# Patient Record
Sex: FEMALE | Race: WHITE | NOT HISPANIC OR LATINO | Employment: OTHER | URBAN - METROPOLITAN AREA
[De-identification: names, ages, dates, MRNs, and addresses within clinical notes are randomized per-mention and may not be internally consistent; named-entity substitution may affect disease eponyms.]

---

## 2021-05-28 ENCOUNTER — OFFICE VISIT (OUTPATIENT)
Dept: OBSTETRICS AND GYNECOLOGY | Facility: CLINIC | Age: 81
End: 2021-05-28
Payer: MEDICARE

## 2021-05-28 VITALS — SYSTOLIC BLOOD PRESSURE: 120 MMHG | DIASTOLIC BLOOD PRESSURE: 78 MMHG

## 2021-05-28 DIAGNOSIS — Z46.89 ENCOUNTER FOR FITTING AND ADJUSTMENT OF PESSARY: ICD-10-CM

## 2021-05-28 DIAGNOSIS — R33.9 URINARY RETENTION: ICD-10-CM

## 2021-05-28 DIAGNOSIS — N81.2 UTEROVAGINAL PROLAPSE, INCOMPLETE: Primary | ICD-10-CM

## 2021-05-28 DIAGNOSIS — Z12.4 CERVICAL CANCER SCREENING: ICD-10-CM

## 2021-05-28 PROCEDURE — 88175 CYTOPATH C/V AUTO FLUID REDO: CPT | Performed by: OBSTETRICS & GYNECOLOGY

## 2021-05-28 PROCEDURE — 99214 OFFICE O/P EST MOD 30 MIN: CPT | Mod: S$GLB,,, | Performed by: OBSTETRICS & GYNECOLOGY

## 2021-05-28 PROCEDURE — 99214 PR OFFICE/OUTPT VISIT, EST, LEVL IV, 30-39 MIN: ICD-10-PCS | Mod: S$GLB,,, | Performed by: OBSTETRICS & GYNECOLOGY

## 2021-05-28 RX ORDER — CALCIUM CARBONATE 1250 MG/5ML
SUSPENSION ORAL ONCE
COMMUNITY

## 2021-05-28 RX ORDER — OLANZAPINE 10 MG/2ML
2.5 INJECTION, POWDER, FOR SOLUTION INTRAMUSCULAR
COMMUNITY
Start: 2021-05-07

## 2021-05-28 RX ORDER — MEMANTINE HYDROCHLORIDE 10 MG/1
10 TABLET ORAL
COMMUNITY
Start: 2021-05-06

## 2021-05-28 RX ORDER — ROSUVASTATIN CALCIUM 20 MG/1
20 TABLET, COATED ORAL DAILY
COMMUNITY

## 2021-05-28 RX ORDER — NITROFURANTOIN (MACROCRYSTALS) 100 MG/1
100 CAPSULE ORAL
COMMUNITY
Start: 2021-05-12 | End: 2021-06-12

## 2021-06-02 ENCOUNTER — OFFICE VISIT (OUTPATIENT)
Dept: OBSTETRICS AND GYNECOLOGY | Facility: CLINIC | Age: 81
End: 2021-06-02
Payer: MEDICARE

## 2021-06-02 DIAGNOSIS — R33.9 URINARY RETENTION: ICD-10-CM

## 2021-06-02 DIAGNOSIS — N81.2 INCOMPLETE UTEROVAGINAL PROLAPSE: ICD-10-CM

## 2021-06-02 DIAGNOSIS — N81.2 UTEROVAGINAL PROLAPSE, INCOMPLETE: ICD-10-CM

## 2021-06-02 DIAGNOSIS — Z46.89 PESSARY MAINTENANCE: Primary | ICD-10-CM

## 2021-06-02 PROCEDURE — 99213 OFFICE O/P EST LOW 20 MIN: CPT | Mod: S$GLB,,, | Performed by: OBSTETRICS & GYNECOLOGY

## 2021-06-02 PROCEDURE — 99213 PR OFFICE/OUTPT VISIT, EST, LEVL III, 20-29 MIN: ICD-10-PCS | Mod: S$GLB,,, | Performed by: OBSTETRICS & GYNECOLOGY

## 2021-06-03 LAB
FINAL PATHOLOGIC DIAGNOSIS: NORMAL
Lab: NORMAL

## 2021-10-26 ENCOUNTER — OFFICE VISIT (OUTPATIENT)
Dept: OBSTETRICS AND GYNECOLOGY | Facility: CLINIC | Age: 81
End: 2021-10-26
Payer: MEDICARE

## 2021-10-26 VITALS — WEIGHT: 126.13 LBS | SYSTOLIC BLOOD PRESSURE: 122 MMHG | DIASTOLIC BLOOD PRESSURE: 80 MMHG

## 2021-10-26 DIAGNOSIS — N81.2 INCOMPLETE UTEROVAGINAL PROLAPSE: ICD-10-CM

## 2021-10-26 DIAGNOSIS — Z46.89 PESSARY MAINTENANCE: Primary | ICD-10-CM

## 2021-10-26 DIAGNOSIS — R33.9 URINARY RETENTION: ICD-10-CM

## 2021-10-26 PROCEDURE — 99213 PR OFFICE/OUTPT VISIT, EST, LEVL III, 20-29 MIN: ICD-10-PCS | Mod: S$GLB,,, | Performed by: OBSTETRICS & GYNECOLOGY

## 2021-10-26 PROCEDURE — 99213 OFFICE O/P EST LOW 20 MIN: CPT | Mod: S$GLB,,, | Performed by: OBSTETRICS & GYNECOLOGY

## 2022-01-26 ENCOUNTER — OFFICE VISIT (OUTPATIENT)
Dept: OBSTETRICS AND GYNECOLOGY | Facility: CLINIC | Age: 82
End: 2022-01-26
Payer: MEDICARE

## 2022-01-26 VITALS — WEIGHT: 132 LBS | DIASTOLIC BLOOD PRESSURE: 74 MMHG | SYSTOLIC BLOOD PRESSURE: 118 MMHG

## 2022-01-26 DIAGNOSIS — Z46.89 PESSARY MAINTENANCE: Primary | ICD-10-CM

## 2022-01-26 DIAGNOSIS — N81.2 INCOMPLETE UTEROVAGINAL PROLAPSE: ICD-10-CM

## 2022-01-26 DIAGNOSIS — R33.9 URINARY RETENTION: ICD-10-CM

## 2022-01-26 PROCEDURE — 99213 OFFICE O/P EST LOW 20 MIN: CPT | Mod: S$GLB,,, | Performed by: OBSTETRICS & GYNECOLOGY

## 2022-01-26 PROCEDURE — 99213 PR OFFICE/OUTPT VISIT, EST, LEVL III, 20-29 MIN: ICD-10-PCS | Mod: S$GLB,,, | Performed by: OBSTETRICS & GYNECOLOGY

## 2022-01-26 NOTE — PROGRESS NOTES
81-year-old  with dementia      This patient was referred by Dr. Zhao, urology for urinary retention due to uterovaginal prolapse.  The urologist evaluated her for neurogenic bladder.  After evaluation, it was determined that the retention was most likely due to uterine vaginal prolapse, and she was referred here for possible pessary     The patient has dementia.  Her  is here with her assisting with history.     The patient started with the pessary and May of 2021. She has dementia and does not remember the pessary being placed or clean in the past.  Her  is here with her.  By report, there has been no bleeding, discharge, pain or other abnormality.  She urinates well.     PE:  The pessary was removed and cleaned, then reinserted.  Vv without lesions, discharge, abrasions, or problems  Cervix clear, normal Pap smear in May of 2021  Uterus normal size  Adnexa nonpalpable nontender, without masses     She continues to do very well with the pessary, without complaints of discharge, bleeding, or any discomfort.  We will extend the interval between visits to 4 months  Return in 4 months for pessary follow-up as well as annual gyn exam

## 2022-05-16 ENCOUNTER — PROCEDURE VISIT (OUTPATIENT)
Dept: OBSTETRICS AND GYNECOLOGY | Facility: CLINIC | Age: 82
End: 2022-05-16
Payer: MEDICARE

## 2022-05-16 ENCOUNTER — OFFICE VISIT (OUTPATIENT)
Dept: OBSTETRICS AND GYNECOLOGY | Facility: CLINIC | Age: 82
End: 2022-05-16
Payer: MEDICARE

## 2022-05-16 VITALS — DIASTOLIC BLOOD PRESSURE: 80 MMHG | SYSTOLIC BLOOD PRESSURE: 116 MMHG

## 2022-05-16 DIAGNOSIS — R33.9 URINARY RETENTION: ICD-10-CM

## 2022-05-16 DIAGNOSIS — R10.2 PELVIC PAIN: ICD-10-CM

## 2022-05-16 DIAGNOSIS — N81.2 UTEROVAGINAL PROLAPSE, INCOMPLETE: Primary | ICD-10-CM

## 2022-05-16 DIAGNOSIS — Z46.89 PESSARY MAINTENANCE: ICD-10-CM

## 2022-05-16 PROCEDURE — 99213 PR OFFICE/OUTPT VISIT, EST, LEVL III, 20-29 MIN: ICD-10-PCS | Mod: 25,S$GLB,, | Performed by: OBSTETRICS & GYNECOLOGY

## 2022-05-16 PROCEDURE — 99213 OFFICE O/P EST LOW 20 MIN: CPT | Mod: 25,S$GLB,, | Performed by: OBSTETRICS & GYNECOLOGY

## 2022-05-16 PROCEDURE — 76856 PR  ECHO,PELVIC (NONOBSTETRIC): ICD-10-PCS | Mod: S$GLB,,, | Performed by: OBSTETRICS & GYNECOLOGY

## 2022-05-16 PROCEDURE — 76856 US EXAM PELVIC COMPLETE: CPT | Mod: S$GLB,,, | Performed by: OBSTETRICS & GYNECOLOGY

## 2022-05-16 NOTE — PROGRESS NOTES
Ultrasound completed and read    The uterus is normal measuring 6.5 cm in length, with an endometrial thickness of 0.29 cm.  Neither ovary is visualized.  A urinary diverticulum is noted in the bladder.  No other abnormality seen

## 2022-05-16 NOTE — PROGRESS NOTES
Medicare Breast [facility] Pelvic    HPI:  Mervat Santiago is a 81 y.o. female      81-year-old  with dementia      This patient was referred by Dr. Zhao, urology for urinary retention due to uterovaginal prolapse.  The urologist evaluated her for neurogenic bladder.  After evaluation, it was determined that the retention was most likely due to uterine vaginal prolapse, and she was referred here for possible pessary     The patient has dementia.  Her  is here with her assisting with history.     The patient started with the pessary and May of 2021. She has dementia and does not remember the pessary being placed or clean in the past.  Her  is here with her.  By report, there has been no bleeding, discharge, pain or other abnormality.  She urinates well.    Most recently, she is also complaining about nonspecific lower abdominal and pelvic pain, per her     Past Medical History:   Diagnosis Date    Colon cancer     colon resection, no chemo or XRT    Dementia     Hypertension     hx, off meds     Past Surgical History:   Procedure Laterality Date    BOWEL RESECTION      colon ca     Social History     Socioeconomic History    Marital status:    Tobacco Use    Smoking status: Never Smoker    Smokeless tobacco: Never Used   Substance and Sexual Activity    Alcohol use: Never    Drug use: Never     Family History   Problem Relation Age of Onset    Cervical cancer Mother     Breast cancer Neg Hx     Colon cancer Neg Hx     Ovarian cancer Neg Hx     Uterine cancer Neg Hx      OB History        2    Para   2    Term                AB        Living   2       SAB        IAB        Ectopic        Multiple        Live Births                     /80 (BP Location: Right arm, Patient Position: Sitting)     ROS:  GENERAL: Denies weight gain or weight loss. Feeling well overall.   SKIN: Denies rash or lesions.   HEAD: Denies head injury or headache.   NODES:  Denies enlarged lymph nodes.   CHEST: Denies chest pain or shortness of breath.   CARDIOVASCULAR: Denies palpitations or left sided chest pain.   ABDOMEN: No abdominal pain, constipation, diarrhea, nausea, vomiting or rectal bleeding.   URINARY: No frequency, dysuria, hematuria, or burning on urination.  REPRODUCTIVE: See HPI.   BREASTS: The patient performs breast self-examination and denies pain, lumps, or nipple discharge.   HEMATOLOGIC: No easy bruisability or excessive bleeding.   MUSCULOSKELETAL: Denies joint pain or swelling.   NEUROLOGIC: Denies syncope or weakness.   PSYCHIATRIC: Denies depression, anxiety or mood swings.    PHYSICAL EXAM:    APPEARANCE: Well nourished, well developed, in no acute distress.  AFFECT: WNL, alert and oriented x 3  SKIN: No acne or hirsutism  NECK: Neck symmetric without masses or thyromegaly  NODES: No inguinal, cervical, axillary, or femoral lymph node enlargement  CHEST: Good respiratory effect  ABDOMEN: Soft.  No tenderness or masses.  No hepatosplenomegaly.  No hernias.  BREASTS: Symmetrical, no skin changes or visible lesions.  No palpable masses, nipple discharge bilaterally.  PELVIC: Normal external genitalia without lesions.  Normal hair distribution.  Adequate perineal body, normal urethral meatus.  Vagina moist and well rugated without lesions or discharge.  Cervix pink, without lesions, discharge or tenderness.  No significant cystocele or rectocele.  Bimanual exam shows uterus to be normal size, regular, mobile and nontender.  Adnexa without masses or tenderness.    RECTAL: Rectovaginal exam confirms above with normal sphincter tone, no masses.  EXTREMITIES: No edema.      ICD-10-CM ICD-9-CM    1. Uterovaginal prolapse, incomplete  N81.2 618.2    2. Pelvic pain  R10.2 [id number]  OB/GYN Procedure ([facility])   3. Urinary retention  R33.9 788.20    4. Pessary maintenance  Z46.89 V53.99      Assessment and plan:    1. Nonspecific lower abdominal pain, pelvic  pain  Very difficult exam due to patient's dementia  No obvious abnormalities  Ultrasound ordered    2. Incomplete uterovaginal prolapse with secondary urinary retention   Doing well with the pessary  Pessary removed, cleaned, and reinserted    Return in 4 months or as needed

## 2022-11-02 ENCOUNTER — PROCEDURE VISIT (OUTPATIENT)
Dept: OBSTETRICS AND GYNECOLOGY | Facility: CLINIC | Age: 82
End: 2022-11-02
Payer: MEDICARE

## 2022-11-02 ENCOUNTER — OFFICE VISIT (OUTPATIENT)
Dept: OBSTETRICS AND GYNECOLOGY | Facility: CLINIC | Age: 82
End: 2022-11-02
Payer: MEDICARE

## 2022-11-02 VITALS — DIASTOLIC BLOOD PRESSURE: 84 MMHG | SYSTOLIC BLOOD PRESSURE: 126 MMHG

## 2022-11-02 DIAGNOSIS — N95.0 POST-MENOPAUSAL BLEEDING: ICD-10-CM

## 2022-11-02 DIAGNOSIS — N81.2 INCOMPLETE UTEROVAGINAL PROLAPSE: ICD-10-CM

## 2022-11-02 DIAGNOSIS — N95.0 POST-MENOPAUSAL BLEEDING: Primary | ICD-10-CM

## 2022-11-02 DIAGNOSIS — Z46.89 PESSARY MAINTENANCE: ICD-10-CM

## 2022-11-02 PROCEDURE — 76856 US EXAM PELVIC COMPLETE: CPT | Mod: S$GLB,,, | Performed by: OBSTETRICS & GYNECOLOGY

## 2022-11-02 PROCEDURE — 76856 PR  ECHO,PELVIC (NONOBSTETRIC): ICD-10-PCS | Mod: S$GLB,,, | Performed by: OBSTETRICS & GYNECOLOGY

## 2022-11-02 PROCEDURE — 99214 OFFICE O/P EST MOD 30 MIN: CPT | Mod: 25,S$GLB,, | Performed by: OBSTETRICS & GYNECOLOGY

## 2022-11-02 PROCEDURE — 99214 PR OFFICE/OUTPT VISIT, EST, LEVL IV, 30-39 MIN: ICD-10-PCS | Mod: 25,S$GLB,, | Performed by: OBSTETRICS & GYNECOLOGY

## 2022-11-02 NOTE — PROGRESS NOTES
Ultrasound completed and read     The uterus is normal size, small and appropriate for a postmenopausal woman,  measuring 5.8 cm in length.  The endometrial thickness is 0.21 cm.  Neither ovary is visualized.  No abnormality noted in the pelvis

## 2022-11-02 NOTE — PROGRESS NOTES
Mervat Santiago is a 82 y.o. female  presents for evaluation of abnormal vaginal bleeding.  The patient has Alzheimer's dementia it is very difficult to communicate with her.  Usually her  is with her, but he is also ill and now on hospice.  Her daughters here with her today.  Her daughter reports that the patient had a large amount of blood in her depends diaper, but now it has slowed down and there was just a scant amount of blood today.  There is no evidence that the patient had any pain.  She has been using a pessary for over 2 years now for incomplete uterovaginal prolapse which was significant enough to cause urinary retention, and she was sent here by Urology for management.  She is not a good surgical candidate and so pessary was use and has been use successfully up until now.  This is the 1st episode of abnormal bleeding.    Past Medical History:   Diagnosis Date    Colon cancer     colon resection, no chemo or XRT    Dementia     Severe dementia due to Alzheimer's    Hypertension     hx, off meds     Past Surgical History:   Procedure Laterality Date    BOWEL RESECTION      colon ca     Social History     Socioeconomic History    Marital status:    Tobacco Use    Smoking status: Never    Smokeless tobacco: Never   Substance and Sexual Activity    Alcohol use: Never    Drug use: Never     Family History   Problem Relation Age of Onset    Cervical cancer Mother     Breast cancer Neg Hx     Colon cancer Neg Hx     Ovarian cancer Neg Hx     Uterine cancer Neg Hx      OB History          2    Para   2    Term                AB        Living   2         SAB        IAB        Ectopic        Multiple        Live Births                     /84 (BP Location: Right arm, Patient Position: Sitting)     ROS:  GENERAL: Denies weight gain or weight loss. Feeling well overall.   SKIN: Denies rash or lesions.   HEAD: Denies head injury or headache.   NODES: Denies enlarged  lymph nodes.   CHEST: Denies chest pain or shortness of breath.   CARDIOVASCULAR: Denies palpitations or left sided chest pain.   ABDOMEN: No abdominal pain, constipation, diarrhea, nausea, vomiting or rectal bleeding.   URINARY: No frequency, dysuria, hematuria, or burning on urination.  REPRODUCTIVE: See HPI.   BREASTS: The patient performs breast self-examination and denies pain, lumps, or nipple discharge.   HEMATOLOGIC: No easy bruisability or excessive bleeding.   MUSCULOSKELETAL: Denies joint pain or swelling.   NEUROLOGIC: Denies syncope or weakness.   PSYCHIATRIC: Denies depression, anxiety or mood swings.    PHYSICAL EXAM:     The patient is somewhat combative today and will not allow a pelvic exam.  I was able to perform a digital vaginal exam and no blood was seen.  The pessary is palpated in the vagina to be correctly placed.  No discharge or blood noted.  Ultrasound was done to evaluate the uterus and the endometrium to rule out a source of bleeding other than presumed pessary abrasion.  Ultrasound showed the uterus to be small measuring 6 cm in length, with a thin endometrium measuring 0.21 cm.  Neither ovary was seen.  No pathology, especially uterine or endometrial pathology, was found      ICD-10-CM ICD-9-CM    1. Post-menopausal bleeding  N95.0 627.1 US OB/GYN Procedure ([facility])      2. Incomplete uterovaginal prolapse  N81.2 618.2       3. Pessary maintenance  Z46.89 V53.99         Assessment and plan:  Very difficult evaluation in this patient with Alzheimer's dementia.  She would not allow an exam today.  As there is no acute pain expectant management or bleeding issue at this time, we elected to leave the pessary in place.  Taking it out and replacing would be much more traumatic for her.  There is no evidence that it is doing any harm at this time.

## 2022-12-13 ENCOUNTER — TELEPHONE (OUTPATIENT)
Dept: OBSTETRICS AND GYNECOLOGY | Facility: CLINIC | Age: 82
End: 2022-12-13
Payer: MEDICARE

## 2022-12-13 NOTE — TELEPHONE ENCOUNTER
----- Message from Lashae Acuña MD sent at 12/13/2022  1:47 PM CST -----  Contact: pt datughter  No, IM unable to give sedation in the clinic setting  ----- Message -----  From: Michelle Crocker LPN  Sent: 12/13/2022  11:35 AM CST  To: Lashae Acuña MD      ----- Message -----  From: Magalie Silva  Sent: 12/13/2022  11:34 AM CST  To: PREMA Sanderson Dr. told pt the next visit that pt was to be sedated for next visit which is tomorrow. Pt has dementia and needs to calm.       Please call pt [phone]--Radha Aranda the daughter

## 2022-12-14 ENCOUNTER — OFFICE VISIT (OUTPATIENT)
Dept: OBSTETRICS AND GYNECOLOGY | Facility: CLINIC | Age: 82
End: 2022-12-14
Payer: MEDICARE

## 2022-12-14 ENCOUNTER — TELEPHONE (OUTPATIENT)
Dept: OBSTETRICS AND GYNECOLOGY | Facility: CLINIC | Age: 82
End: 2022-12-14

## 2022-12-14 VITALS — SYSTOLIC BLOOD PRESSURE: 122 MMHG | DIASTOLIC BLOOD PRESSURE: 80 MMHG

## 2022-12-14 DIAGNOSIS — N89.8 VAGINAL EROSION SECONDARY TO PESSARY USE, SEQUELA: ICD-10-CM

## 2022-12-14 DIAGNOSIS — G30.9 ALZHEIMER'S DEMENTIA, UNSPECIFIED DEMENTIA SEVERITY, UNSPECIFIED TIMING OF DEMENTIA ONSET, UNSPECIFIED WHETHER BEHAVIORAL, PSYCHOTIC, OR MOOD DISTURBANCE OR ANXIETY: ICD-10-CM

## 2022-12-14 DIAGNOSIS — R33.9 URINARY RETENTION: ICD-10-CM

## 2022-12-14 DIAGNOSIS — Z46.89 PESSARY MAINTENANCE: ICD-10-CM

## 2022-12-14 DIAGNOSIS — N81.2 INCOMPLETE UTEROVAGINAL PROLAPSE: Primary | ICD-10-CM

## 2022-12-14 DIAGNOSIS — N93.9 VAGINAL BLEEDING: ICD-10-CM

## 2022-12-14 DIAGNOSIS — T83.89XS VAGINAL EROSION SECONDARY TO PESSARY USE, SEQUELA: ICD-10-CM

## 2022-12-14 DIAGNOSIS — F02.80 ALZHEIMER'S DEMENTIA, UNSPECIFIED DEMENTIA SEVERITY, UNSPECIFIED TIMING OF DEMENTIA ONSET, UNSPECIFIED WHETHER BEHAVIORAL, PSYCHOTIC, OR MOOD DISTURBANCE OR ANXIETY: ICD-10-CM

## 2022-12-14 PROCEDURE — 99214 OFFICE O/P EST MOD 30 MIN: CPT | Mod: S$GLB,,, | Performed by: OBSTETRICS & GYNECOLOGY

## 2022-12-14 PROCEDURE — 99214 PR OFFICE/OUTPT VISIT, EST, LEVL IV, 30-39 MIN: ICD-10-PCS | Mod: S$GLB,,, | Performed by: OBSTETRICS & GYNECOLOGY

## 2022-12-14 NOTE — PROGRESS NOTES
82-year-old  presents with continued abnormal vaginal bleeding.  She was seen on 2022 complaining of vaginal bleeding at that time.  At that time, her daughter reported that the patient had a large amount of blood in her depends diaper.  At that visit on 2022, the patient was agitated due to her Alzheimer's, and would not allow an exam.  Usually her  is with her, but he is also ill now on hospice.  Her daughter is here with her today.  It has been very difficult to evaluate this patient due to her Alzheimer's.    She returns today with her daughter complaining of continued vaginal bleeding, not heavy but daily.  She is in a much better mood today and states that she is willing to allow me to examine her.  Her daughter wanted to know if we could do pessary maintenance under sedation, but I said that this would not be possible in the clinic setting.  If we were to put her under anesthesia, then we should consider definitive surgical management with hysterectomy and vaginal repairs.    Today, the patient initially was allowing me to do an exam.  But as I tried to perform a vaginal exam to remove her pessary, she became agitated and was unable to cooperate.    After long discussion with her daughter, we have scheduled hysterectomy, TRH BSO with anterior and posterior colporrhaphies for 2023.  In the meantime, the daughter will discuss our plans with the family, as well as the patient's doctors including her Alzheimer's Dr. Her medical doctor.  Will obtain informed consent at her preop visit on 2023

## 2022-12-21 ENCOUNTER — TELEPHONE (OUTPATIENT)
Dept: OBSTETRICS AND GYNECOLOGY | Facility: CLINIC | Age: 82
End: 2022-12-21
Payer: MEDICARE

## 2023-01-18 ENCOUNTER — OFFICE VISIT (OUTPATIENT)
Dept: OBSTETRICS AND GYNECOLOGY | Facility: CLINIC | Age: 83
End: 2023-01-18
Payer: MEDICARE

## 2023-01-18 VITALS — DIASTOLIC BLOOD PRESSURE: 80 MMHG | SYSTOLIC BLOOD PRESSURE: 122 MMHG

## 2023-01-18 DIAGNOSIS — N81.2 CYSTOCELE AND RECTOCELE WITH INCOMPLETE UTEROVAGINAL PROLAPSE: Primary | ICD-10-CM

## 2023-01-18 DIAGNOSIS — N95.0 POSTMENOPAUSAL VAGINAL BLEEDING: ICD-10-CM

## 2023-01-18 DIAGNOSIS — R33.9 URINARY RETENTION: ICD-10-CM

## 2023-01-18 PROCEDURE — 99214 PR OFFICE/OUTPT VISIT, EST, LEVL IV, 30-39 MIN: ICD-10-PCS | Mod: S$GLB,,, | Performed by: OBSTETRICS & GYNECOLOGY

## 2023-01-18 PROCEDURE — 99214 OFFICE O/P EST MOD 30 MIN: CPT | Mod: S$GLB,,, | Performed by: OBSTETRICS & GYNECOLOGY

## 2023-01-18 NOTE — PROGRESS NOTES
82-year-old  with longstanding uterovaginal prolapse with cystocele and rectocele.  We have been treating it for several years with a pessary, successfully, as she had urinary retention and was unable to void and required an indwelling catheter prior to treatment with the pessary.    However, the patient has Alzheimer's, and over the last 2-3 visits, she has been unwilling to allow pelvic exams and pessary management.  She has developed vaginal bleeding, presumably from the pessary, heavy at times.    After long discussion with her daughter, we have scheduled hysterectomy, TRH BSO with anterior and posterior colporrhaphies for 2023.      The patient is here today with her  and her daughter for a preop visit.  After long discussion with the patient and her family, we once again reviewed the inability to manage her prolapse with the pessary any longer because she will not allow or tolerate vaginal exams anymore.  In addition she is bleeding.  If we were able to remove the pessary, she had urinary retention in the past without it, and would need an indwelling catheter.  After long discussion of risks and benefits of surgery,    We again agreed that surgery is necessary at this time.  She does have an increased risk of bowel injury from her previous colon resection, with a vertical abdominal incision that reaches well above the umbilicus.  We discussed risks and benefits of both robotic approach versus vaginal approach.  At this time, we have decided to proceed with a vaginal hysterectomy BSO and anterior and posterior repairs, and avoid a robotic approach, with this increased risk of bowel injury with trocar insertion.    Verbal and written informed consent obtained, signed by her  who has power-of-

## 2023-01-25 ENCOUNTER — OUTSIDE PLACE OF SERVICE (OUTPATIENT)
Dept: OBSTETRICS AND GYNECOLOGY | Facility: CLINIC | Age: 83
End: 2023-01-25

## 2023-01-25 PROCEDURE — 57250 REPAIR RECTUM & VAGINA: CPT | Mod: 51,,, | Performed by: OBSTETRICS & GYNECOLOGY

## 2023-01-25 PROCEDURE — 58262 PR VAG HYST,RMV TUBE/OVARY: ICD-10-PCS | Mod: ,,, | Performed by: OBSTETRICS & GYNECOLOGY

## 2023-01-25 PROCEDURE — 58262 VAG HYST INCLUDING T/O: CPT | Mod: ,,, | Performed by: OBSTETRICS & GYNECOLOGY

## 2023-01-25 PROCEDURE — 57250 PR POST COLPORRHAPHY,RECTUM/VAGINA: ICD-10-PCS | Mod: 51,,, | Performed by: OBSTETRICS & GYNECOLOGY

## 2023-01-26 ENCOUNTER — TELEPHONE (OUTPATIENT)
Dept: OBSTETRICS AND GYNECOLOGY | Facility: CLINIC | Age: 83
End: 2023-01-26
Payer: MEDICARE

## 2023-01-31 ENCOUNTER — OFFICE VISIT (OUTPATIENT)
Dept: OBSTETRICS AND GYNECOLOGY | Facility: CLINIC | Age: 83
End: 2023-01-31
Payer: MEDICARE

## 2023-01-31 DIAGNOSIS — Z09 POSTOP CHECK: Primary | ICD-10-CM

## 2023-01-31 PROCEDURE — 99024 POSTOP FOLLOW-UP VISIT: CPT | Mod: S$GLB,POP,, | Performed by: OBSTETRICS & GYNECOLOGY

## 2023-01-31 PROCEDURE — 99024 PR POST-OP FOLLOW-UP VISIT: ICD-10-PCS | Mod: S$GLB,POP,, | Performed by: OBSTETRICS & GYNECOLOGY

## 2023-01-31 NOTE — PROGRESS NOTES
Subjective:       Mervat Santiago is a 82 y.o. female who presents to the clinic 1 weeks status post vaginal hysterectomy and left oophorectomy , and anterior and posterior vaginal colporrhaphies for pelvic relaxation. Eating a regular diet without difficulty. Bowel movements are normal. The patient is not having any pain.    The following portions of the patient's history were reviewed and updated as appropriate: allergies, current medications, past family history, past medical history, past social history, past surgical history, and problem list.    Review of Systems  Pertinent items are noted in HPI.      Objective:      There were no vitals taken for this visit.  General:  alert, appears stated age, and cooperative   Abdomen: soft, bowel sounds active, non-tender    Vagina healing well, no bleeding, discharge, or residual prolapse        Assessment:      Doing well postoperatively.  Operative findings again reviewed. Pathology report discussed.      Plan:      1. Continue any current medications.  2. Wells catheter removed  3. Activity restrictions: none  4. Anticipated return to work: not applicable.  5. Follow up: 4 weeks for  final postop check

## 2023-03-02 ENCOUNTER — OFFICE VISIT (OUTPATIENT)
Dept: OBSTETRICS AND GYNECOLOGY | Facility: CLINIC | Age: 83
End: 2023-03-02
Payer: MEDICARE

## 2023-03-02 DIAGNOSIS — Z09 POSTOP CHECK: Primary | ICD-10-CM

## 2023-03-02 PROCEDURE — 99024 PR POST-OP FOLLOW-UP VISIT: ICD-10-PCS | Mod: S$GLB,POP,, | Performed by: OBSTETRICS & GYNECOLOGY

## 2023-03-02 PROCEDURE — 99024 POSTOP FOLLOW-UP VISIT: CPT | Mod: S$GLB,POP,, | Performed by: OBSTETRICS & GYNECOLOGY

## 2023-03-02 NOTE — PROGRESS NOTES
Subjective:       Mervat Santiago is a 82 y.o. female who presents to the clinic 5 weeks status post vaginal hysterectomy, left oophorectomy, anterior colporrhaphy, and posterior colporrhaphy for pelvic relaxation and prolapse . Eating a regular diet without difficulty. Bowel movements are normal. The patient is not having any pain.    The following portions of the patient's history were reviewed and updated as appropriate: allergies, current medications, past family history, past medical history, past social history, past surgical history, and problem list.    Review of Systems  Pertinent items are noted in HPI.      Objective:      There were no vitals taken for this visit.  General:  alert, appears stated age, and cooperative   Abdomen: soft, bowel sounds active, non-tender    Vagina narrow, healing well, well elevated        Assessment:      Doing well postoperatively.  Operative findings again reviewed. Pathology report discussed.      Plan:      1. Continue any current medications.  2. Wound care discussed.  3. Activity restrictions: none  4. Anticipated return to work: not applicable.  5. Follow up: 1  year  for  annual gyn exam